# Patient Record
Sex: FEMALE | Race: WHITE | RURAL
[De-identification: names, ages, dates, MRNs, and addresses within clinical notes are randomized per-mention and may not be internally consistent; named-entity substitution may affect disease eponyms.]

---

## 2017-06-30 ENCOUNTER — OFFICE VISIT (OUTPATIENT)
Dept: FAMILY MEDICINE CLINIC | Age: 22
End: 2017-06-30

## 2017-06-30 VITALS
SYSTOLIC BLOOD PRESSURE: 104 MMHG | HEIGHT: 68 IN | BODY MASS INDEX: 19.94 KG/M2 | RESPIRATION RATE: 17 BRPM | OXYGEN SATURATION: 99 % | TEMPERATURE: 98 F | WEIGHT: 131.6 LBS | HEART RATE: 83 BPM | DIASTOLIC BLOOD PRESSURE: 60 MMHG

## 2017-06-30 DIAGNOSIS — Z72.0 TOBACCO ABUSE: ICD-10-CM

## 2017-06-30 DIAGNOSIS — F32.A DEPRESSION, UNSPECIFIED DEPRESSION TYPE: Primary | ICD-10-CM

## 2017-06-30 RX ORDER — BUPROPION HYDROCHLORIDE 150 MG/1
150 TABLET ORAL
Qty: 30 TAB | Refills: 0 | Status: SHIPPED | OUTPATIENT
Start: 2017-06-30 | End: 2017-08-10 | Stop reason: SDUPTHER

## 2017-06-30 NOTE — PROGRESS NOTES
Chief Complaint   Patient presents with    Medication Evaluation     patient would like to get on Wellbutrin

## 2017-06-30 NOTE — MR AVS SNAPSHOT
Visit Information Date & Time Provider Department Dept. Phone Encounter #  
 6/30/2017  3:20 PM Aaliyah Mendez MD [address] [id number] Follow-up Instructions Return in about 2 weeks (around 7/14/2017). Follow-up and Disposition History Upcoming Health Maintenance Date Due  
 HPV AGE 9Y-34Y (1 of 3 - Female 3 Dose Series) 4/7/2006 Pneumococcal 19-64 Medium Risk (1 of 1 - PPSV23) 4/7/2014 DTaP/Tdap/Td series (1 - Tdap) 4/7/2016 PAP AKA CERVICAL CYTOLOGY 4/7/2016 INFLUENZA AGE 9 TO ADULT 8/1/2017 Allergies as of 6/30/2017  Review Complete On: 6/30/2017 By: Jemima Bangura LPN Severity Noted Reaction Type Reactions Sulfa (Sulfonamide Antibiotics)  11/09/2013    Unknown (comments) Current Immunizations  Never Reviewed No immunizations on file. Not reviewed this visit You Were Diagnosed With   
  
 Codes Comments Depression, unspecified depression type    -  Primary ICD-10-CM: F32.9 ICD-9-CM: 765 Tobacco abuse     ICD-10-CM: Z72.0 ICD-9-CM: 305.1 Vitals BP Pulse Temp Resp Height(growth percentile) Weight(growth percentile) 104/60 (BP 1 Location: Right arm, BP Patient Position: Sitting) 83 98 °F (36.7 °C) (Temporal) 17 5' 8.25\" (1.734 m) 131 lb 9.6 oz (59.7 kg) LMP SpO2 BMI OB Status Smoking Status 06/29/2017 (Approximate) 99% 19.86 kg/m2 Having regular periods Current Some Day Smoker Vitals History BMI and BSA Data Body Mass Index Body Surface Area  
 19.86 kg/m 2 1.7 m 2 Preferred Pharmacy Pharmacy Name Phone [facility] [address] [place], [facility] [address] Your Updated Medication List  
  
   
This list is accurate as of: 6/30/17  4:00 PM.  Always use your most recent med list.  
  
  
  
  
 buPROPion  mg tablet Commonly known as:  Marleni Saenz Take 1 Tab by mouth every morning. Prescriptions Sent to Pharmacy Refills buPROPion XL (WELLBUTRIN XL) 150 mg tablet 0 Sig: Take 1 Tab by mouth every morning. Class: Normal  
 Pharmacy: [facility] [address] [place] [address], [address] [place] [address] [address]  #: [phone] Route: Oral  
  
We Performed the Following CBC WITH AUTOMATED DIFF [02969 CPT(R)] METABOLIC PANEL, COMPREHENSIVE [11330 CPT(R)] PA COLLECTION VENOUS BLOOD,VENIPUNCTURE [id number] CPT(R)] PA HANDLG&/OR CONVEY OF SPEC FOR TR OFFICE TO LAB [84835 CPT(R)] TSH 3RD GENERATION [37723 CPT(R)] Follow-up Instructions Return in about 2 weeks (around 7/14/2017). Introducing [facility] SERVICES! Toribio Nyhan introduces [employer] patient portal. Now you can access parts of your medical record, email your doctor's office, and request medication refills online. 1. In your internet browser, go to [email or web address] 2. Click on the First Time User? Click Here link in the Sign In box. You will see the New Member Sign Up page. 3. Enter your [employer] Access Code exactly as it appears below. You will not need to use this code after youve completed the sign-up process. If you do not sign up before the expiration date, you must request a new code. · [employer] Access Code: [email or web address] Expires: 9/28/2017  3:09 PM 
 
4. Enter the last four digits of your Social Security Number (xxxx) and Date of Birth (mm/dd/yyyy) as indicated and click Submit. You will be taken to the next sign-up page. 5. Create a [employer] ID. This will be your [employer] login ID and cannot be changed, so think of one that is secure and easy to remember. 6. Create a [employer] password. You can change your password at any time. 7. Enter your Password Reset Question and Answer. This can be used at a later time if you forget your password. 8. Enter your e-mail address. You will receive e-mail notification when new information is available in [address] 9. Click Sign Up. You can now view and download portions of your medical record. 10. Click the Download Summary menu link to download a portable copy of your medical information. If you have questions, please visit the Frequently Asked Questions section of the "[employer] website. Remember, "[employer] is NOT to be used for urgent needs. For medical emergencies, dial 911. Now available from your iPhone and Android! Please provide this summary of care documentation to your next provider. If you have any questions after today's visit, please call [phone].

## 2017-07-01 LAB
ALBUMIN SERPL-MCNC: 4.4 G/DL (ref 3.5–5.5)
ALBUMIN/GLOB SERPL: 2 {RATIO} (ref 1.2–2.2)
ALP SERPL-CCNC: 55 IU/L (ref 39–117)
ALT SERPL-CCNC: 13 IU/L (ref 0–32)
AST SERPL-CCNC: 14 IU/L (ref 0–40)
BASOPHILS # BLD AUTO: 0 X10E3/UL (ref 0–0.2)
BASOPHILS NFR BLD AUTO: 0 %
BILIRUB SERPL-MCNC: <0.2 MG/DL (ref 0–1.2)
BUN SERPL-MCNC: 9 MG/DL (ref 6–20)
BUN/CREAT SERPL: 19 (ref 9–23)
CALCIUM SERPL-MCNC: 8.8 MG/DL (ref 8.7–10.2)
CHLORIDE SERPL-SCNC: 103 MMOL/L (ref 96–106)
CO2 SERPL-SCNC: 27 MMOL/L (ref 18–29)
CREAT SERPL-MCNC: 0.48 MG/DL (ref 0.57–1)
EOSINOPHIL # BLD AUTO: 0.1 X10E3/UL (ref 0–0.4)
EOSINOPHIL NFR BLD AUTO: 2 %
ERYTHROCYTE [DISTWIDTH] IN BLOOD BY AUTOMATED COUNT: 13.2 % (ref 12.3–15.4)
GLOBULIN SER CALC-MCNC: 2.2 G/DL (ref 1.5–4.5)
GLUCOSE SERPL-MCNC: 89 MG/DL (ref 65–99)
HCT VFR BLD AUTO: 39.2 % (ref 34–46.6)
HGB BLD-MCNC: 12.5 G/DL (ref 11.1–15.9)
IMM GRANULOCYTES # BLD: 0 X10E3/UL (ref 0–0.1)
IMM GRANULOCYTES NFR BLD: 0 %
LYMPHOCYTES # BLD AUTO: 2.1 X10E3/UL (ref 0.7–3.1)
LYMPHOCYTES NFR BLD AUTO: 31 %
MCH RBC QN AUTO: 28.9 PG (ref 26.6–33)
MCHC RBC AUTO-ENTMCNC: 31.9 G/DL (ref 31.5–35.7)
MCV RBC AUTO: 91 FL (ref 79–97)
MONOCYTES # BLD AUTO: 0.5 X10E3/UL (ref 0.1–0.9)
MONOCYTES NFR BLD AUTO: 8 %
NEUTROPHILS # BLD AUTO: 4 X10E3/UL (ref 1.4–7)
NEUTROPHILS NFR BLD AUTO: 59 %
PLATELET # BLD AUTO: 256 X10E3/UL (ref 150–379)
POTASSIUM SERPL-SCNC: 4.4 MMOL/L (ref 3.5–5.2)
PROT SERPL-MCNC: 6.6 G/DL (ref 6–8.5)
RBC # BLD AUTO: 4.33 X10E6/UL (ref 3.77–5.28)
SODIUM SERPL-SCNC: 142 MMOL/L (ref 134–144)
TSH SERPL DL<=0.005 MIU/L-ACNC: 1.51 UIU/ML (ref 0.45–4.5)
WBC # BLD AUTO: 6.8 X10E3/UL (ref 3.4–10.8)

## 2017-07-02 NOTE — PROGRESS NOTES
Call pt, the CBC is normal  The sugar, kidney and liver tests are normal and also  The thyroid test is normal

## 2017-07-14 ENCOUNTER — OFFICE VISIT (OUTPATIENT)
Dept: FAMILY MEDICINE CLINIC | Age: 22
End: 2017-07-14

## 2017-07-14 VITALS
RESPIRATION RATE: 20 BRPM | DIASTOLIC BLOOD PRESSURE: 84 MMHG | TEMPERATURE: 97.2 F | OXYGEN SATURATION: 98 % | BODY MASS INDEX: 19.85 KG/M2 | HEIGHT: 68 IN | SYSTOLIC BLOOD PRESSURE: 105 MMHG | WEIGHT: 131 LBS | HEART RATE: 80 BPM

## 2017-07-14 DIAGNOSIS — F32.A DEPRESSION, UNSPECIFIED DEPRESSION TYPE: Primary | ICD-10-CM

## 2017-07-14 NOTE — PROGRESS NOTES
HISTORY OF PRESENT ILLNESS  Randi Escoto is a 25 y.o. female. Chief Complaint   Patient presents with    Follow-up     wellbutrin efficacy, 2 week follow up       HPI  Pt has not started Wellbutrin  Did not get the Rx  Thought we were waiting for the BW results  All BW came back normal    ROS  Past Medical History:   Diagnosis Date    Adjustment disorder      Current Outpatient Prescriptions   Medication Sig Dispense Refill    buPROPion XL (WELLBUTRIN XL) 150 mg tablet Take 1 Tab by mouth every morning. 30 Tab 0     Allergies   Allergen Reactions    Sulfa (Sulfonamide Antibiotics) Unknown (comments)     Visit Vitals    /84 (BP 1 Location: Left arm, BP Patient Position: Sitting)    Pulse 80    Temp 97.2 °F (36.2 °C)    Resp 20    Ht 5' 8\" (1.727 m)    Wt 131 lb (59.4 kg)    LMP 06/29/2017 (Approximate)    SpO2 98%    BMI 19.92 kg/m2     Physical Exam   Constitutional: She is oriented to person, place, and time. She appears well-developed and well-nourished. No distress. HENT:   Head: Normocephalic and atraumatic. Eyes: Conjunctivae and EOM are normal.   Pulmonary/Chest: Effort normal.   Neurological: She is alert and oriented to person, place, and time. Psychiatric: She has a normal mood and affect. Nursing note and vitals reviewed. ASSESSMENT and PLAN    ICD-10-CM ICD-9-CM    1.  Depression, unspecified depression type F32.9 311    advised pt to go  the Rx and try the medication Wellbutrin and F/U with us in 2 w

## 2017-07-14 NOTE — MR AVS SNAPSHOT
Visit Information Date & Time Provider Department Dept. Phone Encounter #  
 7/14/2017  2:00 PM Love Almonte MD [address] [id number] Upcoming Health Maintenance Date Due  
 HPV AGE 9Y-34Y (1 of 3 - Female 3 Dose Series) 4/7/2006 Pneumococcal 19-64 Medium Risk (1 of 1 - PPSV23) 4/7/2014 DTaP/Tdap/Td series (1 - Tdap) 4/7/2016 PAP AKA CERVICAL CYTOLOGY 4/7/2016 INFLUENZA AGE 9 TO ADULT 8/1/2017 Allergies as of 7/14/2017  Review Complete On: 7/14/2017 By: Jourdan Chris LPN Severity Noted Reaction Type Reactions Sulfa (Sulfonamide Antibiotics)  11/09/2013    Unknown (comments) Current Immunizations  Never Reviewed No immunizations on file. Not reviewed this visit Vitals BP Pulse Temp Resp Height(growth percentile) Weight(growth percentile) 105/84 (BP 1 Location: Left arm, BP Patient Position: Sitting) 80 97.2 °F (36.2 °C) 20 5' 8\" (1.727 m) 131 lb (59.4 kg) LMP SpO2 BMI OB Status Smoking Status 06/29/2017 (Approximate) 98% 19.92 kg/m2 Having regular periods Current Some Day Smoker BMI and BSA Data Body Mass Index Body Surface Area  
 19.92 kg/m 2 1.69 m 2 Preferred Pharmacy Pharmacy Name Phone [facility] [address] [place] [address], [address] [place] [address] Your Updated Medication List  
  
   
This list is accurate as of: 7/14/17  2:05 PM.  Always use your most recent med list.  
  
  
  
  
 buPROPion  mg tablet Commonly known as:  Obed Butt Take 1 Tab by mouth every morning. Introducing [facility]! Tashia Jones introduces [employer] patient portal. Now you can access parts of your medical record, email your doctor's office, and request medication refills online. 1. In your internet browser, go to [email or web address] 2. Click on the First Time User? Click Here link in the Sign In box.  You will see the New Member Sign Up page. 3. Enter your [employer] Access Code exactly as it appears below. You will not need to use this code after youve completed the sign-up process. If you do not sign up before the expiration date, you must request a new code. · [employer] Access Code: [email or web address] Expires: 9/28/2017  3:09 PM 
 
4. Enter the last four digits of your Social Security Number (xxxx) and Date of Birth (mm/dd/yyyy) as indicated and click Submit. You will be taken to the next sign-up page. 5. Create a [employer] ID. This will be your [employer] login ID and cannot be changed, so think of one that is secure and easy to remember. 6. Create a [employer] password. You can change your password at any time. 7. Enter your Password Reset Question and Answer. This can be used at a later time if you forget your password. 8. Enter your e-mail address. You will receive e-mail notification when new information is available in [address] [facility] 9. Click Sign Up. You can now view and download portions of your medical record. 10. Click the Download Summary menu link to download a portable copy of your medical information. If you have questions, please visit the Frequently Asked Questions section of the [employer] website. Remember, [employer] is NOT to be used for urgent needs. For medical emergencies, dial 911. Now available from your iPhone and Android! Please provide this summary of care documentation to your next provider. Your primary care clinician is listed as Radha Flowers. If you have any questions after today's visit, please call [phone].

## 2017-08-10 ENCOUNTER — OFFICE VISIT (OUTPATIENT)
Dept: FAMILY MEDICINE CLINIC | Age: 22
End: 2017-08-10

## 2017-08-10 VITALS
RESPIRATION RATE: 16 BRPM | SYSTOLIC BLOOD PRESSURE: 104 MMHG | BODY MASS INDEX: 19.22 KG/M2 | WEIGHT: 126.8 LBS | HEART RATE: 76 BPM | HEIGHT: 68 IN | OXYGEN SATURATION: 99 % | DIASTOLIC BLOOD PRESSURE: 62 MMHG | TEMPERATURE: 96.9 F

## 2017-08-10 DIAGNOSIS — Z72.0 TOBACCO ABUSE: ICD-10-CM

## 2017-08-10 DIAGNOSIS — F32.A DEPRESSION, UNSPECIFIED DEPRESSION TYPE: Primary | ICD-10-CM

## 2017-08-10 RX ORDER — BUPROPION HYDROCHLORIDE 150 MG/1
150 TABLET ORAL
Qty: 30 TAB | Refills: 0 | Status: SHIPPED | OUTPATIENT
Start: 2017-08-10 | End: 2017-08-10 | Stop reason: SDUPTHER

## 2017-08-10 RX ORDER — BUPROPION HYDROCHLORIDE 150 MG/1
150 TABLET ORAL
Qty: 30 TAB | Refills: 4 | Status: SHIPPED | OUTPATIENT
Start: 2017-08-10 | End: 2017-11-30

## 2017-08-10 NOTE — PROGRESS NOTES
HISTORY OF PRESENT ILLNESS  Rufus Victoria is a 25 y.o. female. Chief Complaint   Patient presents with    Medication Refill       HPI   Here for F/U of Wellbutrin  Doing well  No SE  Quit smoking on Sat/Sun  No withdrawal  No cravings  Doing well but noted more congestion    In Mood good now  Back in therapy  Also On eye movement desensitization and effective  Loss of appetite a little and lost a little weight    More drainage and cough  Drinking more water     Review of Systems   HENT: Positive for congestion. Respiratory: Positive for cough. Psychiatric/Behavioral: Negative for depression. The patient is not nervous/anxious. Past Medical History:   Diagnosis Date    Adjustment disorder      Current Outpatient Prescriptions   Medication Sig Dispense Refill    buPROPion XL (WELLBUTRIN XL) 150 mg tablet Take 1 Tab by mouth every morning. 30 Tab 4     Allergies   Allergen Reactions    Sulfa (Sulfonamide Antibiotics) Unknown (comments)     Visit Vitals    /62 (BP 1 Location: Right arm, BP Patient Position: Sitting)    Pulse 76    Temp 96.9 °F (36.1 °C) (Oral)    Resp 16    Ht 5' 8\" (1.727 m)    Wt 126 lb 12.8 oz (57.5 kg)    LMP 07/22/2017    SpO2 99%    BMI 19.28 kg/m2     Physical Exam   Constitutional: She is oriented to person, place, and time. She appears well-developed and well-nourished. No distress. HENT:   Head: Normocephalic and atraumatic. Eyes: Conjunctivae and EOM are normal.   Pulmonary/Chest: Effort normal.   Neurological: She is alert and oriented to person, place, and time. Skin: Skin is dry. Psychiatric: She has a normal mood and affect. Nursing note and vitals reviewed. ASSESSMENT and PLAN    ICD-10-CM ICD-9-CM    1. Depression, unspecified depression type F32.9 311 buPROPion XL (WELLBUTRIN XL) 150 mg tablet      DISCONTINUED: buPROPion XL (WELLBUTRIN XL) 150 mg tablet   2.  Tobacco abuse Z72.0 305.1 buPROPion XL (WELLBUTRIN XL) 150 mg tablet DISCONTINUED: buPROPion XL (WELLBUTRIN XL) 150 mg tablet   doing much better  Cont for about 6 mo  Cont Therapy  Then may taper off  Cont to avoid Tob use

## 2017-08-10 NOTE — PROGRESS NOTES
1. Have you been to the ER, urgent care clinic since your last visit? Hospitalized since your last visit? NO    2. Have you seen or consulted any other health care providers outside of the [address] [place] [address] since your last visit? Include any pap smears or colon screening.  No

## 2017-11-30 ENCOUNTER — OFFICE VISIT (OUTPATIENT)
Dept: FAMILY MEDICINE CLINIC | Age: 22
End: 2017-11-30

## 2017-11-30 VITALS
TEMPERATURE: 97.8 F | SYSTOLIC BLOOD PRESSURE: 116 MMHG | OXYGEN SATURATION: 100 % | WEIGHT: 126 LBS | HEART RATE: 95 BPM | HEIGHT: 68 IN | RESPIRATION RATE: 18 BRPM | DIASTOLIC BLOOD PRESSURE: 72 MMHG | BODY MASS INDEX: 19.1 KG/M2

## 2017-11-30 DIAGNOSIS — R10.9 STOMACH PAIN: Primary | ICD-10-CM

## 2017-11-30 DIAGNOSIS — R19.7 DIARRHEA, UNSPECIFIED TYPE: ICD-10-CM

## 2017-11-30 DIAGNOSIS — R21 FACIAL RASH: ICD-10-CM

## 2017-11-30 LAB
BILIRUB UR QL STRIP: NEGATIVE
GLUCOSE UR-MCNC: NEGATIVE MG/DL
KETONES P FAST UR STRIP-MCNC: NORMAL MG/DL
PH UR STRIP: 7.5 [PH] (ref 4.6–8)
PROT UR QL STRIP: NEGATIVE
SP GR UR STRIP: 1.02 (ref 1–1.03)
UA UROBILINOGEN AMB POC: NORMAL (ref 0.2–1)
URINALYSIS CLARITY POC: NORMAL
URINALYSIS COLOR POC: YELLOW
URINE BLOOD POC: NEGATIVE
URINE LEUKOCYTES POC: NORMAL
URINE NITRITES POC: NEGATIVE

## 2017-11-30 NOTE — PROGRESS NOTES
HISTORY OF PRESENT ILLNESS  Jayy Singh is a 25 y.o. female. Chief Complaint   Patient presents with    Diarrhea     several mos, fluctuates betw/ constipation and diarrhea, gassy, urgency after eating, irritataed facial skin       HPI  Would like GI referral    For 2 mo oscillating between diarrhea and constipation  But mostly diarrhea, about 1-3x a day  Not at night    Travelled in [place], [facility] and [facility] but had sy before  No bad water  No bad food    Got yeast infection  Was eating more bread and water    More facial acne  And on side of upper legs  no weight loss    Has eaten Broccoli and   More bloated with bread      Review of Systems   Constitutional: Negative for fever and weight loss. HENT: Negative for congestion. Respiratory: Negative for cough. Cardiovascular: Negative for chest pain. Gastrointestinal: Positive for diarrhea. Negative for abdominal pain, blood in stool, melena, nausea and vomiting. Genitourinary: Positive for frequency. Negative for dysuria, flank pain, hematuria and urgency. Past Medical History:   Diagnosis Date    Adjustment disorder        Allergies   Allergen Reactions    Sulfa (Sulfonamide Antibiotics) Unknown (comments)     Visit Vitals    /72 (BP 1 Location: Right arm, BP Patient Position: Sitting)    Pulse 95    Temp 97.8 °F (36.6 °C) (Temporal)    Resp 18    Ht 5' 8\" (1.727 m)    Wt 126 lb (57.2 kg)    SpO2 100%    BMI 19.16 kg/m2     Physical Exam   Constitutional: She is oriented to person, place, and time. She appears well-developed and well-nourished. No distress. HENT:   Head: Normocephalic and atraumatic. Mouth/Throat: Oropharynx is clear and moist. No oropharyngeal exudate. Eyes: Conjunctivae and EOM are normal. Pupils are equal, round, and reactive to light. Cardiovascular: Normal rate and regular rhythm. Pulmonary/Chest: Effort normal and breath sounds normal.   Abdominal: Soft. She exhibits no distension.  There is tenderness (mild bilat). Musculoskeletal: She exhibits no edema. Lymphadenopathy:     She has no cervical adenopathy. Neurological: She is alert and oriented to person, place, and time. Skin: Skin is warm and dry. Mild facial acne around mouth   Psychiatric: She has a normal mood and affect. Nursing note and vitals reviewed. Recent Results (from the past 12 hour(s))   AMB POC URINALYSIS DIP STICK MANUAL W/O MICRO    Collection Time: 11/30/17  3:59 PM   Result Value Ref Range    Color (UA POC) Yellow     Clarity (UA POC) Slightly Cloudy     Glucose (UA POC) Negative Negative    Bilirubin (UA POC) Negative Negative    Ketones (UA POC) 2+ Negative    Specific gravity (UA POC) 1.020 1.001 - 1.035    Blood (UA POC) Negative Negative    pH (UA POC) 7.5 4.6 - 8.0    Protein (UA POC) Negative Negative    Urobilinogen (UA POC) 0.2 mg/dL 0.2 - 1    Nitrites (UA POC) Negative Negative    Leukocyte esterase (UA POC) 1+ Negative       ASSESSMENT and PLAN    ICD-10-CM ICD-9-CM    1. Stomach pain R10.9 536.8 AMB POC URINALYSIS DIP STICK MANUAL W/O MICRO      CULTURE, URINE      ALLERGEN PANEL, FOOD, BASIC   2. Diarrhea, unspecified type R19.7 787.91 WBC, STOOL      OCCULT BLOOD, IMMUNOASSAY (FIT)      C DIFFICILE TOXIN GENE, MILLIE      CULTURE, STOOL      CELIAC ANTIBODY PROFILE      OVA & PARASITES, STOOL      ALLERGEN PANEL, FOOD, BASIC   3.  Facial rash R21 782.1 CELIAC ANTIBODY PROFILE      ALLERGEN PANEL, FOOD, BASIC   elimination diet rec with wheat and lactose for now

## 2017-11-30 NOTE — LETTER
12/5/2017 8:00 AM 
 
Ms. Virgene Cogan [address] [place] [phone] Dear Virgene Cogan: 
 
Please find your most recent results below. Resulted Orders AMB POC URINALYSIS DIP STICK MANUAL W/O MICRO Result Value Ref Range Color (UA POC) Yellow Clarity (UA POC) Slightly Cloudy Glucose (UA POC) Negative Negative Bilirubin (UA POC) Negative Negative Ketones (UA POC) 2+ Negative Specific gravity (UA POC) 1.020 1.001 - 1.035 Blood (UA POC) Negative Negative pH (UA POC) 7.5 4.6 - 8.0 Protein (UA POC) Negative Negative Urobilinogen (UA POC) 0.2 mg/dL 0.2 - 1 Nitrites (UA POC) Negative Negative Leukocyte esterase (UA POC) 1+ Negative CELIAC ANTIBODY PROFILE Result Value Ref Range Immunoglobulin A, Qt. 256 87 - 352 mg/dL Deamidated Gliadin Ab, IgA 6 0 - 19 units Comment:  
                      Negative                   0 - 19 Weak Positive             20 - 30 Moderate to Strong Positive   >30 Deamidated Gliadin Ab, IgG 2 0 - 19 units Comment:  
                      Negative                   0 - 19 Weak Positive             20 - 30 Moderate to Strong Positive   >30 
  
 t-Transglutaminase, IgA <2 0 - 3 U/mL Comment:  
                                 Negative        0 -  3 Weak Positive   4 - 10 Positive           >10 Tissue Transglutaminase (tTG) has been identified 
 as the endomysial antigen. Studies have demonstr- 
 ated that endomysial IgA antibodies have over 99% 
 specificity for gluten sensitive enteropathy. t-Transglutaminase, IgG <2 0 - 5 U/mL Comment:  
                                 Negative        0 - 5 Weak Positive   6 - 9 Positive           >9 Narrative Performed at:  [place] [address] [address]  [id number] : Sanya Mendez MD, Phone:  [phone] CULTURE, URINE Result Value Ref Range Urine Culture, Routine No growth Narrative Performed at:  [address]  [id number] : Sanya Mendez MD, Phone:  [phone] ALLERGEN PANEL, FOOD, BASIC Result Value Ref Range CLASS DESCRIPTION Comment Comment:  
       Levels of Specific IgE       Class  Description of Class 
    ---------------------------  -----  -------------------- 
                   < 0.10         0         Negative 0.10 -    0.31         0/I       Equivocal/Low 
           0.32 -    0.55         I         Low 
           0.56 -    1.40         II        Moderate 1.41 -    3.90         III       High 
           3.91 -   19.00         IV        Very High 
          19.01 -  100.00         V         Very High 
                  >100.00         VI        Very High Egg White <0.10 Class 0 kU/L Milk (Cow) <0.10 Class 0 kU/L Codfish <0.10 Class 0 kU/L Wheat <0.10 Class 0 kU/L Peanut <0.10 Class 0 kU/L Soybean <0.10 Class 0 kU/L Narrative Performed at:  [address]  [id number] : Sanya Mendez MD, Phone:  [phone] ALLERGY TESTING IS NORMAL/NEGATIVE Please call me if you have any questions: [phone] Sincerely, 
 
 
Jesus Washington MD

## 2017-11-30 NOTE — MR AVS SNAPSHOT
Visit Information Date & Time Provider Department Dept. Phone Encounter #  
 11/30/2017  3:40 PM Sofia Gonzalez  [facility] [phone] [id number] Follow-up Instructions Return in about 2 weeks (around 12/14/2017). Follow-up and Disposition History Your Appointments 12/15/2017  7:00 AM  
Any with Sofia Gonzalez MD  
[address] [facility] ([address]) [facility] [address]  [phone]  
[phone]  
  
   
 [address] [facility] [address] Upcoming Health Maintenance Date Due  
 HPV AGE 9Y-34Y (1 of 3 - Female 3 Dose Series) 4/7/2006 Pneumococcal 19-64 Medium Risk (1 of 1 - PPSV23) 4/7/2014 DTaP/Tdap/Td series (1 - Tdap) 4/7/2016 PAP AKA CERVICAL CYTOLOGY 4/7/2016 Influenza Age 5 to Adult 8/1/2017 Allergies as of 11/30/2017  Review Complete On: 11/30/2017 By: Kaleb Torres LPN Severity Noted Reaction Type Reactions Sulfa (Sulfonamide Antibiotics)  11/09/2013    Unknown (comments) Current Immunizations  Never Reviewed No immunizations on file. Not reviewed this visit You Were Diagnosed With   
  
 Codes Comments Stomach pain    -  Primary ICD-10-CM: R10.9 ICD-9-CM: 536.8 Diarrhea, unspecified type     ICD-10-CM: R19.7 ICD-9-CM: 787.91 Facial rash     ICD-10-CM: R21 
ICD-9-CM: 782.1 Vitals BP Pulse Temp Resp Height(growth percentile) 116/72 (BP 1 Location: Right arm, BP Patient Position: Sitting) 95 97.8 °F (36.6 °C) (Temporal) 18 5' 8\" (1.727 m) Weight(growth percentile) SpO2 BMI OB Status Smoking Status 126 lb (57.2 kg) 100% 19.16 kg/m2 Having regular periods Current Some Day Smoker BMI and BSA Data Body Mass Index Body Surface Area  
 19.16 kg/m 2 1.66 m 2 Preferred Pharmacy Pharmacy Name Phone [facility] [address], [address] [place] [address] Se 8. Enter your e-mail address. You will receive e-mail notification when new information is available in [address] 9. Click Sign Up. You can now view and download portions of your medical record. 10. Click the Download Summary menu link to download a portable copy of your medical information. If you have questions, please visit the Frequently Asked Questions section of the [employer] website. Remember, [employer] is NOT to be used for urgent needs. For medical emergencies, dial 911. Now available from your iPhone and Android! Please provide this summary of care documentation to your next provider. Your primary care clinician is listed as Radha Flowers. If you have any questions after today's visit, please call [phone].

## 2017-11-30 NOTE — LETTER
12/4/2017 10:18 AM 
 
Ms. Alfred Moore [address] [place] [phone] Dear Alfred Moore: 
 
Please find your most recent results below. Resulted Orders AMB POC URINALYSIS DIP STICK MANUAL W/O MICRO Result Value Ref Range Color (UA POC) Yellow Clarity (UA POC) Slightly Cloudy Glucose (UA POC) Negative Negative Bilirubin (UA POC) Negative Negative Ketones (UA POC) 2+ Negative Specific gravity (UA POC) 1.020 1.001 - 1.035 Blood (UA POC) Negative Negative pH (UA POC) 7.5 4.6 - 8.0 Protein (UA POC) Negative Negative Urobilinogen (UA POC) 0.2 mg/dL 0.2 - 1 Nitrites (UA POC) Negative Negative Leukocyte esterase (UA POC) 1+ Negative CULTURE, URINE Result Value Ref Range Urine Culture, Routine No growth Narrative Performed at:  [address]  [id number] : Yessica Murphy MD, Phone:  [phone] Urine culture was negative for infection Please call me if you have any questions: [phone] Sincerely, 
 
 
Sandrita Mejia MD

## 2017-12-02 LAB — BACTERIA UR CULT: NO GROWTH

## 2017-12-05 LAB
CODFISH IGE QN: <0.1 KU/L
COW MILK IGE QN: <0.1 KU/L
EGG WHITE IGE QN: <0.1 KU/L
GLIADIN PEPTIDE IGA SER-ACNC: 6 UNITS (ref 0–19)
GLIADIN PEPTIDE IGG SER-ACNC: 2 UNITS (ref 0–19)
IGA SERPL-MCNC: 256 MG/DL (ref 87–352)
Lab: NORMAL
PEANUT IGE QN: <0.1 KU/L
SOYBEAN IGE QN: <0.1 KU/L
TTG IGA SER-ACNC: <2 U/ML (ref 0–3)
TTG IGG SER-ACNC: <2 U/ML (ref 0–5)
WHEAT IGE QN: <0.1 KU/L

## 2017-12-07 LAB
C DIFF TOX GENS STL QL NAA+PROBE: NEGATIVE
HEMOCCULT STL QL IA: NEGATIVE
WBC STL QL MICRO: NORMAL

## 2017-12-08 LAB — O+P SPEC MICRO: NORMAL

## 2017-12-10 LAB
CAMPYLOBACTER STL CULT: NORMAL
E COLI SXT STL QL IA: NEGATIVE
SALM + SHIG STL CULT: NORMAL

## 2017-12-15 ENCOUNTER — TELEPHONE (OUTPATIENT)
Dept: FAMILY MEDICINE CLINIC | Age: 22
End: 2017-12-15

## 2017-12-15 ENCOUNTER — OFFICE VISIT (OUTPATIENT)
Dept: FAMILY MEDICINE CLINIC | Age: 22
End: 2017-12-15

## 2017-12-15 VITALS
HEART RATE: 65 BPM | DIASTOLIC BLOOD PRESSURE: 61 MMHG | SYSTOLIC BLOOD PRESSURE: 104 MMHG | TEMPERATURE: 95.4 F | WEIGHT: 123.8 LBS | RESPIRATION RATE: 14 BRPM | OXYGEN SATURATION: 100 % | BODY MASS INDEX: 18.82 KG/M2

## 2017-12-15 DIAGNOSIS — Z72.0 TOBACCO ABUSE: ICD-10-CM

## 2017-12-15 DIAGNOSIS — R19.7 DIARRHEA, UNSPECIFIED TYPE: Primary | ICD-10-CM

## 2017-12-15 DIAGNOSIS — F32.A DEPRESSION, UNSPECIFIED DEPRESSION TYPE: ICD-10-CM

## 2017-12-15 DIAGNOSIS — Z71.6 ENCOUNTER FOR TOBACCO USE CESSATION COUNSELING: ICD-10-CM

## 2017-12-15 DIAGNOSIS — Z23 ENCOUNTER FOR IMMUNIZATION: ICD-10-CM

## 2017-12-15 RX ORDER — BUPROPION HYDROCHLORIDE 150 MG/1
TABLET, EXTENDED RELEASE ORAL DAILY
COMMUNITY
End: 2017-12-15 | Stop reason: SDUPTHER

## 2017-12-15 RX ORDER — BUPROPION HYDROCHLORIDE 150 MG/1
150 TABLET ORAL
Qty: 30 TAB | Refills: 5 | Status: SHIPPED | OUTPATIENT
Start: 2017-12-15 | End: 2018-02-20

## 2017-12-15 RX ORDER — BUPROPION HYDROCHLORIDE 150 MG/1
150 TABLET, EXTENDED RELEASE ORAL DAILY
Qty: 30 TAB | Refills: 5 | Status: SHIPPED | OUTPATIENT
Start: 2017-12-15 | End: 2017-12-15 | Stop reason: CLARIF

## 2017-12-15 NOTE — PROGRESS NOTES
Chief Complaint   Patient presents with    GI Problem     2 wk f/u diarrhea & constipation; pt w/more constipation, less diarrhea now     Health Maintenance Due   Topic Date Due    HPV AGE 9Y-26Y (1 of 3 - Female 3 Dose Series) 04/07/2006    Pneumococcal 19-64 Medium Risk (1 of 1 - PPSV23) 04/07/2014    DTaP/Tdap/Td series (1 - Tdap) 04/07/2016    PAP AKA CERVICAL CYTOLOGY  04/07/2016    Influenza Age 9 to Adult  08/01/2017     Visit Vitals    /61 (BP 1 Location: Left arm, BP Patient Position: Sitting)    Pulse 65    Temp 95.4 °F (35.2 °C) (Oral)    Resp 14    Wt 123 lb 12.8 oz (56.2 kg)    LMP 11/28/2016 (Approximate)    SpO2 100%    BMI 18.82 kg/m2     Cali Martin LPN

## 2017-12-15 NOTE — PROGRESS NOTES
HISTORY OF PRESENT ILLNESS  Sierra Naqvi is a 25 y.o. female. Chief Complaint   Patient presents with    GI Problem     2 wk f/u diarrhea & constipation; pt w/more constipation, less diarrhea now       HPI   Diarrhea better  Stool tests were all negative  Normal BM's now occ, about every 2 d  Still occ diarrhea  Once had cheese and ETOH and got bad cramping and diarrhea  Has been taking probiotic and made changes to diet and it helped    Has one refill on Wellbutrin  Taking it for Depression and Tob cessation  Started smoking again and wants to stop again  Prev it helped to stop  Also helping with Depression  No SE  Needs refills    HM reviewed    Review of Systems   Constitutional: Negative for fever. HENT: Positive for congestion (getting over a cold) and sore throat (better). Respiratory: Positive for cough (little). Musculoskeletal: Negative for myalgias. Past Medical History:   Diagnosis Date    Adjustment disorder      Current Outpatient Prescriptions   Medication Sig Dispense Refill    B.infantis-B.ani-B.long-B.bifi (PROBIOTIC 4X) 10-15 mg TbEC Take  by mouth.  buPROPion SR (WELLBUTRIN SR) 150 mg SR tablet Take 1 Tab by mouth daily. 30 Tab 5       Allergies   Allergen Reactions    Sulfa (Sulfonamide Antibiotics) Unknown (comments)     Visit Vitals    /61 (BP 1 Location: Left arm, BP Patient Position: Sitting)    Pulse 65    Temp 95.4 °F (35.2 °C) (Oral)    Resp 14    Wt 123 lb 12.8 oz (56.2 kg)    LMP 11/28/2016 (Approximate)    SpO2 100%    BMI 18.82 kg/m2       Physical Exam   Constitutional: She is oriented to person, place, and time. She appears well-developed and well-nourished. No distress. HENT:   Head: Normocephalic and atraumatic. Eyes: Conjunctivae and EOM are normal.   Pulmonary/Chest: Effort normal.   Neurological: She is alert and oriented to person, place, and time. Psychiatric: She has a normal mood and affect.    Nursing note and vitals reviewed. ASSESSMENT and PLAN    ICD-10-CM ICD-9-CM    1. Diarrhea, unspecified type R19.7 787.91 Cont Probiotic and add Fiber  Cont elimination diet to find out cause  RTC if not better   2. Encounter for tobacco use cessation counseling Z71.6 V65.42 buPROPion SR (WELLBUTRIN SR) 150 mg SR tablet   3. Depression, unspecified depression type F32.9 311 buPROPion SR (WELLBUTRIN SR) 150 mg SR tablet   4.  Encounter for immunization Z23 V03.89 INFLUENZA VIRUS VAC QUAD,SPLIT,PRESV FREE SYRINGE IM      HUMAN PAPILLOMA VIRUS NONAVALENT HPV 3 DOSE IM (GARDASIL 9)  RTC for second shot in 2 mo and 6 mo   Make appt for Pap

## 2017-12-15 NOTE — TELEPHONE ENCOUNTER
Stefani Sanchez from the [facility] states she received the Rx for Wellbutrin SR, but patient usually takes the XL (24 hour extended release). Please advise.

## 2017-12-18 ENCOUNTER — TELEPHONE (OUTPATIENT)
Dept: FAMILY MEDICINE CLINIC | Age: 22
End: 2017-12-18

## 2018-02-20 ENCOUNTER — OFFICE VISIT (OUTPATIENT)
Dept: FAMILY MEDICINE CLINIC | Age: 23
End: 2018-02-20

## 2018-02-20 VITALS
WEIGHT: 127 LBS | BODY MASS INDEX: 19.25 KG/M2 | TEMPERATURE: 97.5 F | RESPIRATION RATE: 18 BRPM | HEIGHT: 68 IN | OXYGEN SATURATION: 100 % | DIASTOLIC BLOOD PRESSURE: 81 MMHG | SYSTOLIC BLOOD PRESSURE: 129 MMHG

## 2018-02-20 DIAGNOSIS — R73.09 LOW GLUCOSE LEVEL: ICD-10-CM

## 2018-02-20 DIAGNOSIS — M25.562 ARTHRALGIA OF BOTH KNEES: ICD-10-CM

## 2018-02-20 DIAGNOSIS — M25.561 ARTHRALGIA OF BOTH KNEES: ICD-10-CM

## 2018-02-20 DIAGNOSIS — R53.83 FATIGUE, UNSPECIFIED TYPE: ICD-10-CM
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
## 2018-02-20 DIAGNOSIS — R19.7 DIARRHEA, UNSPECIFIED TYPE: Primary | ICD-10-CM

## 2018-02-20 DIAGNOSIS — Z23 ENCOUNTER FOR IMMUNIZATION: ICD-10-CM

## 2018-02-20 DIAGNOSIS — R35.0 URINARY FREQUENCY: ICD-10-CM

## 2018-02-20 LAB
BILIRUB UR QL STRIP: NEGATIVE
GLUCOSE POC: 89 MG/DL (ref 70–100)
GLUCOSE UR-MCNC: NEGATIVE MG/DL
KETONES P FAST UR STRIP-MCNC: NEGATIVE MG/DL
PH UR STRIP: 6 [PH] (ref 4.6–8)
PROT UR QL STRIP: NEGATIVE
SP GR UR STRIP: 1.01 (ref 1–1.03)
UA UROBILINOGEN AMB POC: NORMAL (ref 0.2–1)
URINALYSIS CLARITY POC: CLEAR
URINALYSIS COLOR POC: YELLOW
URINE BLOOD POC: NORMAL
URINE LEUKOCYTES POC: NEGATIVE
URINE NITRITES POC: NEGATIVE

## 2018-02-20 NOTE — PROGRESS NOTES
HISTORY OF PRESENT ILLNESS  Sharon Tucker is a 25 y.o. female. Chief Complaint   Patient presents with    Low Blood Sugar     urinating more, lethargy (recently tested at home - 30s)       HPI  Would like BS test  Low BS and weak and tired    Still with digestive issues  Has diarrhea once every day  since last summer  Tried elimination diet  Changed diet  Stool tests were all normal  Not on Probiotic or Fiber anymore  Seem to make it worse    occ pain in toes and knees   tick bites in 2016    Would like the HPV 2nd dose    Review of Systems   Constitutional: Negative for chills and fever. Eyes: Negative for blurred vision. Cardiovascular: Negative for chest pain. Gastrointestinal: Positive for diarrhea (once a day for months), heartburn (sometimes) and nausea (sometimes). Negative for abdominal pain, blood in stool, melena and vomiting. Genitourinary: Positive for frequency and urgency. Negative for dysuria, flank pain and hematuria. Musculoskeletal: Positive for joint pain (right knee, occ left). Negative for myalgias. Neurological: Positive for dizziness, weakness (in muscles) and headaches. Occ shooting pain in toes when waking up   Endo/Heme/Allergies: Positive for polydipsia. Past Medical History:   Diagnosis Date    Adjustment disorder        Allergies   Allergen Reactions    Sulfa (Sulfonamide Antibiotics) Unknown (comments)     Visit Vitals    /81 (BP 1 Location: Right arm, BP Patient Position: Sitting)    Temp 97.5 °F (36.4 °C) (Temporal)    Resp 18    Ht 5' 8\" (1.727 m)    Wt 127 lb (57.6 kg)    SpO2 100%    BMI 19.31 kg/m2     Physical Exam   Constitutional: She is oriented to person, place, and time. She appears well-developed and well-nourished. No distress. HENT:   Head: Normocephalic and atraumatic. Mouth/Throat: Oropharynx is clear and moist. No oropharyngeal exudate. Eyes: Conjunctivae and EOM are normal. Pupils are equal, round, and reactive to light. Cardiovascular: Normal rate and regular rhythm. Pulmonary/Chest: Effort normal and breath sounds normal.   Abdominal: She exhibits no distension. There is no tenderness. Musculoskeletal: She exhibits no edema. Lymphadenopathy:     She has no cervical adenopathy. Neurological: She is alert and oriented to person, place, and time. Skin: Skin is warm and dry. Psychiatric: She has a normal mood and affect. Nursing note and vitals reviewed. Recent Results (from the past 12 hour(s))   AMB POC GLUCOSE BLOOD, BY GLUCOSE MONITORING DEVICE    Collection Time: 02/20/18 12:08 PM   Result Value Ref Range    Glucose POC 89 70 - 100 mg/dl   AMB POC URINALYSIS DIP STICK MANUAL W/O MICRO    Collection Time: 02/20/18 12:09 PM   Result Value Ref Range    Color (UA POC) Yellow Yellow    Clarity (UA POC) Clear Clear    Glucose (UA POC) Negative Negative    Bilirubin (UA POC) Negative Negative    Ketones (UA POC) Negative Negative    Specific gravity (UA POC) 1.015 1.001 - 1.035    Blood (UA POC) Trace Negative    pH (UA POC) 6.0 4.6 - 8.0    Protein (UA POC) Negative Negative    Urobilinogen (UA POC) normal 0.2 - 1    Nitrites (UA POC) Negative Negative    Leukocyte esterase (UA POC) Negative Negative       ASSESSMENT and PLAN    ICD-10-CM ICD-9-CM    1. Diarrhea, unspecified type R19.7 787.91 REFERRAL TO GASTROENTEROLOGY      UT HANDLG&/OR CONVEY OF SPEC FOR TR OFFICE TO LAB      COLLECTION VENOUS BLOOD,VENIPUNCTURE   2. Low glucose level E16.2 251.2 AMB POC GLUCOSE BLOOD, BY GLUCOSE MONITORING DEVICE   3. Tick bite, sequela W57. XXXS 906.2 LYME AB TOTAL W/RFLX W BLOT      R RICKETTSII AB IGG W/REFL      UT HANDLG&/OR CONVEY OF SPEC FOR TR OFFICE TO LAB      COLLECTION VENOUS BLOOD,VENIPUNCTURE   4. Arthralgia of both knees M25.561 719.46 LYME AB TOTAL W/RFLX W BLOT    M25.562  R RICKETTSII AB IGG W/REFL      C REACTIVE PROTEIN, QT      SED RATE (ESR)   5.  Urinary frequency R35.0 788.41 AMB POC URINALYSIS DIP STICK MANUAL W/O MICRO   6. Fatigue, unspecified type R53.83 780.79 CBC WITH AUTOMATED DIFF   7.  Encounter for immunization Z23 V03.89 HUMAN PAPILLOMA VIRUS (HPV) VACCINE, TYPES 6, 11, 16, 18 (QUADRIVALENT), 3 DOSE SCHED., IM

## 2018-02-22 LAB
B BURGDOR IGG+IGM SER-ACNC: <0.91 ISR (ref 0–0.9)
BASOPHILS # BLD AUTO: 0 X10E3/UL (ref 0–0.2)
BASOPHILS NFR BLD AUTO: 0 %
CRP SERPL-MCNC: 0.5 MG/L (ref 0–4.9)
EOSINOPHIL # BLD AUTO: 0.1 X10E3/UL (ref 0–0.4)
EOSINOPHIL NFR BLD AUTO: 1 %
ERYTHROCYTE [DISTWIDTH] IN BLOOD BY AUTOMATED COUNT: 13.3 % (ref 12.3–15.4)
ERYTHROCYTE [SEDIMENTATION RATE] IN BLOOD BY WESTERGREN METHOD: 2 MM/HR (ref 0–32)
HCT VFR BLD AUTO: 41.7 % (ref 34–46.6)
HGB BLD-MCNC: 13.3 G/DL (ref 11.1–15.9)
IMM GRANULOCYTES # BLD: 0 X10E3/UL (ref 0–0.1)
IMM GRANULOCYTES NFR BLD: 0 %
LYMPHOCYTES # BLD AUTO: 1.9 X10E3/UL (ref 0.7–3.1)
LYMPHOCYTES NFR BLD AUTO: 25 %
MCH RBC QN AUTO: 28.5 PG (ref 26.6–33)
MCHC RBC AUTO-ENTMCNC: 31.9 G/DL (ref 31.5–35.7)
MCV RBC AUTO: 90 FL (ref 79–97)
MONOCYTES # BLD AUTO: 0.6 X10E3/UL (ref 0.1–0.9)
MONOCYTES NFR BLD AUTO: 8 %
NEUTROPHILS # BLD AUTO: 5 X10E3/UL (ref 1.4–7)
NEUTROPHILS NFR BLD AUTO: 66 %
PLATELET # BLD AUTO: 276 X10E3/UL (ref 150–379)
R RICKETTSI IGG SER QL IA: NEGATIVE
RBC # BLD AUTO: 4.66 X10E6/UL (ref 3.77–5.28)
WBC # BLD AUTO: 7.6 X10E3/UL (ref 3.4–10.8)

## 2018-02-22 NOTE — PROGRESS NOTES
Call pt, the Lyme test and RMSF are negative  The inflammation tests are normal  And the CBC is normal

## 2022-11-22 NOTE — PROGRESS NOTES
HISTORY OF PRESENT ILLNESS  Edu Mcmullen is a 25 y.o. female. Chief Complaint   Patient presents with    Medication Evaluation     patient would like to get on Wellbutrin       HPI  Wants to try antidepressants  Feeling depressed lately since Jan this year  Went off and worked on form for a year  Came back home and did not have a F/U goal  drifting and don't know what to do    Not sleeping and taking 4 h naps during the day  Drinking 3-4 cups of coffee, no other caffeinated drinks  Weight changes, gained 10-15 lbs    Drinks occ with friends wine or beer  No drugs    Also smoking 1 PPD  Tried to stop before  Was off for 4 mo  Started again when ex boyfriend called her every night    Tried Lexapro before and it helped  Got off 2014    Has done counseling in high school and until last spring    Mother tried Wellbutrin and it helped her to stop smoking    Review of Systems   Constitutional: Negative for fever. Respiratory: Negative for cough. Cardiovascular: Negative for chest pain. Gastrointestinal: Negative for constipation and diarrhea. Neurological: Negative for dizziness, seizures and headaches. Psychiatric/Behavioral: Positive for depression. Negative for hallucinations, substance abuse and suicidal ideas. The patient is nervous/anxious and has insomnia (nightmares and sleep paralysis). Past Medical History:   Diagnosis Date    Adjustment disorder        Allergies   Allergen Reactions    Sulfa (Sulfonamide Antibiotics) Unknown (comments)     Visit Vitals    /60 (BP 1 Location: Right arm, BP Patient Position: Sitting)    Pulse 83    Temp 98 °F (36.7 °C) (Temporal)    Resp 17    Ht 5' 8.25\" (1.734 m)    Wt 131 lb 9.6 oz (59.7 kg)    LMP 06/29/2017 (Approximate)    SpO2 99%    BMI 19.86 kg/m2     Physical Exam   Constitutional: She is oriented to person, place, and time. She appears well-developed and well-nourished. No distress. HENT:   Head: Normocephalic and atraumatic.    Right HYDROcodone-acetaminophen (NORCO) 5-325 MG per tablet      [id number] - [place], [place] - [address] AT [place] [address] [place] [address]  [place] [address]  Phone: [phone] Fax: [phone]   Ear: External ear normal.   Left Ear: External ear normal.   Mouth/Throat: Oropharynx is clear and moist. No oropharyngeal exudate. Eyes: Conjunctivae and EOM are normal. Pupils are equal, round, and reactive to light. Cardiovascular: Normal rate and regular rhythm. Pulmonary/Chest: Effort normal and breath sounds normal.   Musculoskeletal: She exhibits no edema. Lymphadenopathy:     She has no cervical adenopathy. Neurological: She is alert and oriented to person, place, and time. Skin: Skin is warm and dry. Psychiatric: She has a normal mood and affect. Nursing note and vitals reviewed. ASSESSMENT and PLAN    ICD-10-CM ICD-9-CM    1. Depression, unspecified depression type F32.9 311 CBC WITH AUTOMATED DIFF      METABOLIC PANEL, COMPREHENSIVE      TSH 3RD GENERATION      buPROPion XL (WELLBUTRIN XL) 150 mg tablet   2.  Tobacco abuse Z72.0 305.1 buPROPion XL (WELLBUTRIN XL) 150 mg tablet   F/U in 2 w, sooner if any problems including suicidal or homocidal id  Encouraged counseling